# Patient Record
Sex: MALE | Race: WHITE | NOT HISPANIC OR LATINO | Employment: FULL TIME | URBAN - METROPOLITAN AREA
[De-identification: names, ages, dates, MRNs, and addresses within clinical notes are randomized per-mention and may not be internally consistent; named-entity substitution may affect disease eponyms.]

---

## 2019-03-07 ENCOUNTER — HOSPITAL ENCOUNTER (EMERGENCY)
Facility: HOSPITAL | Age: 54
Discharge: HOME | End: 2019-03-07
Attending: EMERGENCY MEDICINE
Payer: COMMERCIAL

## 2019-03-07 VITALS
SYSTOLIC BLOOD PRESSURE: 154 MMHG | HEART RATE: 88 BPM | RESPIRATION RATE: 16 BRPM | DIASTOLIC BLOOD PRESSURE: 81 MMHG | OXYGEN SATURATION: 97 % | WEIGHT: 287.48 LBS | BODY MASS INDEX: 43.71 KG/M2 | TEMPERATURE: 98 F

## 2019-03-07 DIAGNOSIS — K04.7 DENTAL INFECTION: Primary | ICD-10-CM

## 2019-03-07 PROCEDURE — 99282 EMERGENCY DEPT VISIT SF MDM: CPT

## 2019-03-07 RX ORDER — CLINDAMYCIN HYDROCHLORIDE 300 MG/1
300 CAPSULE ORAL 3 TIMES DAILY
Qty: 30 CAPSULE | Refills: 0 | Status: SHIPPED | OUTPATIENT
Start: 2019-03-07 | End: 2019-03-17

## 2019-03-07 NOTE — ED PROVIDER NOTES
History  Chief Complaint   Patient presents with    Dental Pain     pt states he was diagnosed with an abcess in mouth, given antibiotics on monday by dentist  antibiotics are not working, dentist told pt to come to er due to swelling in mouth  58-year-old male presents to the emergency department with complaints of right-sided facial pain and swelling  States that he is at his dentist last week for routine cleaning and started to have some discomfort along his right upper tooth shortly afterward  States that he was started on amoxicillin by his dentist 3 days ago and has been taking the antibiotic as directed  States that he noticed some of the overlying facial swelling has improved but has not resolved  States that his dentist instructed him to come to the emergency department for evaluation as he may need a stronger antibiotic  History provided by:  Patient   used: No        Prior to Admission Medications   Prescriptions Last Dose Informant Patient Reported? Taking?   atorvastatin (LIPITOR) 80 mg tablet   Yes No   Sig: Take 80 mg by mouth daily  lisinopril (ZESTRIL) 2 5 mg tablet   Yes No   Sig: Take 2 5 mg by mouth daily  Facility-Administered Medications: None       Past Medical History:   Diagnosis Date    Hypertension        Past Surgical History:   Procedure Laterality Date    APPENDECTOMY      ARTHROPLASTY TOE Right 7/5/2016    Procedure: GREAT TOE TOTAL JOINT REPLACEMENT ;  Surgeon: Mert Nieto DPM;  [facility];  Service:     SHOULDER ARTHROSCOPY Right        History reviewed  No pertinent family history  I have reviewed and agree with the history as documented  Social History     Tobacco Use    Smoking status: Never Smoker   Substance Use Topics    Alcohol use: No    Drug use: No        Review of Systems   Constitutional: Negative for chills and fever  HENT: Positive for dental problem and facial swelling   Negative for drooling, ear pain, sinus pressure, sneezing, sore throat and trouble swallowing  Eyes: Negative for pain  Respiratory: Negative for cough and chest tightness  Cardiovascular: Negative for chest pain  Skin: Negative for rash and wound  All other systems reviewed and are negative  Physical Exam  Physical Exam   Constitutional: He is oriented to person, place, and time  Vital signs are normal  He appears well-developed and well-nourished  HENT:   Head: Normocephalic and atraumatic  Right Ear: Hearing, tympanic membrane, external ear and ear canal normal    Left Ear: Hearing, tympanic membrane, external ear and ear canal normal    Nose: Nose normal    Mouth/Throat: Uvula is midline and oropharynx is clear and moist  Dental caries present  No dental abscesses  Neck: Trachea normal and normal range of motion  Cardiovascular: Normal rate and regular rhythm  Exam reveals no gallop and no friction rub  No murmur heard  Pulmonary/Chest: Effort normal and breath sounds normal  No respiratory distress  He has no wheezes  He has no rales  Musculoskeletal: Normal range of motion  Neurological: He is alert and oriented to person, place, and time  Skin: Skin is warm and dry  Psychiatric: He has a normal mood and affect  His behavior is normal    Vitals reviewed        Vital Signs  ED Triage Vitals [03/07/19 0805]   Temperature Pulse Respirations Blood Pressure SpO2   98 °F (36 7 °C) 88 16 154/81 97 %      Temp Source Heart Rate Source Patient Position - Orthostatic VS BP Location FiO2 (%)   Oral Monitor Lying Left arm --      Pain Score       4           Vitals:    03/07/19 0805   BP: 154/81   Pulse: 88   Patient Position - Orthostatic VS: Lying       Visual Acuity      ED Medications  Medications - No data to display    Diagnostic Studies  Results Reviewed     None                 No orders to display              Procedures  Procedures       Phone Contacts  ED Phone Contact    ED Course MDM  Number of Diagnoses or Management Options  Dental infection:   Diagnosis management comments: Differential diagnosis includes but not limited to:  Dental pain from dental abscess        Disposition  Final diagnoses:   Dental infection     Time reflects when diagnosis was documented in both MDM as applicable and the Disposition within this note     Time User Action Codes Description Comment    3/7/2019  8:18 AM Sagrario Nuno Add [K04 7] Dental infection       ED Disposition     ED Disposition Condition Date/Time Comment    Discharge Stable u Mar 7, 2019  8:18 AM Lakshmi Downing discharge to home/self care  Follow-up Information     Follow up With Specialties Details Why [address] [place] [address] for Oral and Maxillofacial Surgery [place]  Schedule an appointment as soon as possible for a visit  If symptoms worsen [facility]  [phone]          Discharge Medication List as of 3/7/2019  8:19 AM      START taking these medications    Details   clindamycin (CLEOCIN) 300 MG capsule Take 1 capsule (300 mg total) by mouth 3 (three) times a day for 10 days, Starting u 3/7/2019, Until Sun 3/17/2019, Normal         CONTINUE these medications which have NOT CHANGED    Details   atorvastatin (LIPITOR) 80 mg tablet Take 80 mg by mouth daily  , Until Discontinued, Historical Med      lisinopril (ZESTRIL) 2 5 mg tablet Take 2 5 mg by mouth daily  , Until Discontinued, Historical Med           No discharge procedures on file      ED Provider  Electronically Signed by           Angelita Larios PA-C  03/07/19 425

## 2020-05-01 DIAGNOSIS — I10 ESSENTIAL HYPERTENSION: Primary | ICD-10-CM

## 2020-05-01 RX ORDER — LISINOPRIL AND HYDROCHLOROTHIAZIDE 20; 12.5 MG/1; MG/1
TABLET ORAL
Qty: 30 TABLET | Refills: 0 | Status: SHIPPED | OUTPATIENT
Start: 2020-05-01 | End: 2020-06-22

## 2020-06-22 DIAGNOSIS — I10 ESSENTIAL HYPERTENSION: ICD-10-CM

## 2020-06-22 RX ORDER — LISINOPRIL AND HYDROCHLOROTHIAZIDE 20; 12.5 MG/1; MG/1
TABLET ORAL
Qty: 30 TABLET | Refills: 0 | Status: SHIPPED | OUTPATIENT
Start: 2020-06-22 | End: 2020-07-20

## 2020-07-20 DIAGNOSIS — I10 ESSENTIAL HYPERTENSION: ICD-10-CM

## 2020-07-20 DIAGNOSIS — E78.5 DYSLIPIDEMIA: Primary | ICD-10-CM

## 2020-07-20 RX ORDER — LISINOPRIL AND HYDROCHLOROTHIAZIDE 20; 12.5 MG/1; MG/1
TABLET ORAL
Qty: 30 TABLET | Refills: 0 | Status: SHIPPED | OUTPATIENT
Start: 2020-07-20 | End: 2020-08-14

## 2020-07-20 RX ORDER — ATORVASTATIN CALCIUM 80 MG/1
TABLET, FILM COATED ORAL
Qty: 30 TABLET | Refills: 0 | Status: SHIPPED | OUTPATIENT
Start: 2020-07-20 | End: 2020-08-14

## 2020-08-14 DIAGNOSIS — E78.5 DYSLIPIDEMIA: ICD-10-CM

## 2020-08-14 DIAGNOSIS — I10 ESSENTIAL HYPERTENSION: ICD-10-CM

## 2020-08-14 RX ORDER — LISINOPRIL AND HYDROCHLOROTHIAZIDE 20; 12.5 MG/1; MG/1
TABLET ORAL
Qty: 30 TABLET | Refills: 0 | Status: SHIPPED | OUTPATIENT
Start: 2020-08-14 | End: 2020-09-14

## 2020-08-14 RX ORDER — ATORVASTATIN CALCIUM 80 MG/1
TABLET, FILM COATED ORAL
Qty: 30 TABLET | Refills: 0 | Status: SHIPPED | OUTPATIENT
Start: 2020-08-14 | End: 2020-09-14

## 2020-09-14 DIAGNOSIS — E78.5 DYSLIPIDEMIA: ICD-10-CM

## 2020-09-14 DIAGNOSIS — I10 ESSENTIAL HYPERTENSION: ICD-10-CM

## 2020-09-14 RX ORDER — LISINOPRIL AND HYDROCHLOROTHIAZIDE 20; 12.5 MG/1; MG/1
TABLET ORAL
Qty: 30 TABLET | Refills: 0 | Status: SHIPPED | OUTPATIENT
Start: 2020-09-14 | End: 2020-10-12

## 2020-09-14 RX ORDER — ATORVASTATIN CALCIUM 80 MG/1
TABLET, FILM COATED ORAL
Qty: 30 TABLET | Refills: 0 | Status: SHIPPED | OUTPATIENT
Start: 2020-09-14 | End: 2020-10-12

## 2020-10-12 DIAGNOSIS — E78.5 DYSLIPIDEMIA: ICD-10-CM

## 2020-10-12 DIAGNOSIS — I10 ESSENTIAL HYPERTENSION: ICD-10-CM

## 2020-10-12 RX ORDER — LISINOPRIL AND HYDROCHLOROTHIAZIDE 20; 12.5 MG/1; MG/1
TABLET ORAL
Qty: 30 TABLET | Refills: 0 | Status: SHIPPED | OUTPATIENT
Start: 2020-10-12 | End: 2020-11-08

## 2020-10-12 RX ORDER — ATORVASTATIN CALCIUM 80 MG/1
TABLET, FILM COATED ORAL
Qty: 30 TABLET | Refills: 0 | Status: SHIPPED | OUTPATIENT
Start: 2020-10-12 | End: 2020-11-08

## 2020-11-08 DIAGNOSIS — I10 ESSENTIAL HYPERTENSION: ICD-10-CM

## 2020-11-08 DIAGNOSIS — E78.5 DYSLIPIDEMIA: ICD-10-CM

## 2020-11-08 RX ORDER — LISINOPRIL AND HYDROCHLOROTHIAZIDE 20; 12.5 MG/1; MG/1
TABLET ORAL
Qty: 30 TABLET | Refills: 0 | Status: SHIPPED | OUTPATIENT
Start: 2020-11-08 | End: 2020-12-13

## 2020-11-08 RX ORDER — ATORVASTATIN CALCIUM 80 MG/1
TABLET, FILM COATED ORAL
Qty: 30 TABLET | Refills: 0 | Status: SHIPPED | OUTPATIENT
Start: 2020-11-08 | End: 2020-12-13

## 2020-12-13 DIAGNOSIS — I10 ESSENTIAL HYPERTENSION: ICD-10-CM

## 2020-12-13 DIAGNOSIS — E78.5 DYSLIPIDEMIA: ICD-10-CM

## 2020-12-13 RX ORDER — LISINOPRIL AND HYDROCHLOROTHIAZIDE 20; 12.5 MG/1; MG/1
TABLET ORAL
Qty: 30 TABLET | Refills: 0 | Status: SHIPPED | OUTPATIENT
Start: 2020-12-13 | End: 2021-01-21

## 2020-12-13 RX ORDER — ATORVASTATIN CALCIUM 80 MG/1
TABLET, FILM COATED ORAL
Qty: 30 TABLET | Refills: 0 | Status: SHIPPED | OUTPATIENT
Start: 2020-12-13 | End: 2021-01-21

## 2021-01-21 ENCOUNTER — TELEPHONE (OUTPATIENT)
Dept: FAMILY MEDICINE CLINIC | Facility: CLINIC | Age: 56
End: 2021-01-21

## 2021-01-21 DIAGNOSIS — E78.5 DYSLIPIDEMIA: ICD-10-CM

## 2021-01-21 DIAGNOSIS — I10 ESSENTIAL HYPERTENSION: Primary | ICD-10-CM

## 2021-01-21 DIAGNOSIS — Z12.5 SCREENING FOR MALIGNANT NEOPLASM OF PROSTATE: ICD-10-CM

## 2021-01-21 DIAGNOSIS — I10 ESSENTIAL HYPERTENSION: ICD-10-CM

## 2021-01-21 RX ORDER — LISINOPRIL AND HYDROCHLOROTHIAZIDE 20; 12.5 MG/1; MG/1
TABLET ORAL
Qty: 30 TABLET | Refills: 0 | Status: SHIPPED | OUTPATIENT
Start: 2021-01-21 | End: 2021-02-19

## 2021-01-21 RX ORDER — ATORVASTATIN CALCIUM 80 MG/1
TABLET, FILM COATED ORAL
Qty: 30 TABLET | Refills: 0 | Status: SHIPPED | OUTPATIENT
Start: 2021-01-21 | End: 2021-02-19

## 2021-01-21 NOTE — TELEPHONE ENCOUNTER
Is working 7 days a week, with [employer], VERY hard to get here SO he scheduled a virtual visit on 2/2  He would like labs done prior & his wife can  script here tomorrow, if you're willing to order

## 2021-01-24 LAB
ALBUMIN SERPL-MCNC: 4.8 G/DL (ref 3.6–5.1)
ALBUMIN/GLOB SERPL: 1.9 (CALC) (ref 1–2.5)
ALP SERPL-CCNC: 67 U/L (ref 35–144)
ALT SERPL-CCNC: 30 U/L (ref 9–46)
AST SERPL-CCNC: 25 U/L (ref 10–35)
BASOPHILS # BLD AUTO: 66 CELLS/UL (ref 0–200)
BASOPHILS NFR BLD AUTO: 0.6 %
BILIRUB SERPL-MCNC: 0.9 MG/DL (ref 0.2–1.2)
BUN SERPL-MCNC: 16 MG/DL (ref 7–25)
BUN/CREAT SERPL: NORMAL (CALC) (ref 6–22)
CALCIUM SERPL-MCNC: 9.5 MG/DL (ref 8.6–10.3)
CHLORIDE SERPL-SCNC: 102 MMOL/L (ref 98–110)
CHOLEST SERPL-MCNC: 186 MG/DL
CHOLEST/HDLC SERPL: 3.5 (CALC)
CO2 SERPL-SCNC: 24 MMOL/L (ref 20–32)
CREAT SERPL-MCNC: 0.8 MG/DL (ref 0.7–1.33)
EOSINOPHIL # BLD AUTO: 275 CELLS/UL (ref 15–500)
EOSINOPHIL NFR BLD AUTO: 2.5 %
ERYTHROCYTE [DISTWIDTH] IN BLOOD BY AUTOMATED COUNT: 11.7 % (ref 11–15)
GLOBULIN SER CALC-MCNC: 2.5 G/DL (CALC) (ref 1.9–3.7)
GLUCOSE SERPL-MCNC: 99 MG/DL (ref 65–99)
HCT VFR BLD AUTO: 45.9 % (ref 38.5–50)
HDLC SERPL-MCNC: 53 MG/DL
HGB BLD-MCNC: 15.8 G/DL (ref 13.2–17.1)
LDLC SERPL CALC-MCNC: 93 MG/DL (CALC)
LYMPHOCYTES # BLD AUTO: 2684 CELLS/UL (ref 850–3900)
LYMPHOCYTES NFR BLD AUTO: 24.4 %
MCH RBC QN AUTO: 33.2 PG (ref 27–33)
MCHC RBC AUTO-ENTMCNC: 34.4 G/DL (ref 32–36)
MCV RBC AUTO: 96.4 FL (ref 80–100)
MONOCYTES # BLD AUTO: 968 CELLS/UL (ref 200–950)
MONOCYTES NFR BLD AUTO: 8.8 %
NEUTROPHILS # BLD AUTO: 7007 CELLS/UL (ref 1500–7800)
NEUTROPHILS NFR BLD AUTO: 63.7 %
NONHDLC SERPL-MCNC: 133 MG/DL (CALC)
PLATELET # BLD AUTO: 275 THOUSAND/UL (ref 140–400)
PMV BLD REES-ECKER: 9.4 FL (ref 7.5–12.5)
POTASSIUM SERPL-SCNC: 4.3 MMOL/L (ref 3.5–5.3)
PROT SERPL-MCNC: 7.3 G/DL (ref 6.1–8.1)
PSA SERPL-MCNC: 0.5 NG/ML
RBC # BLD AUTO: 4.76 MILLION/UL (ref 4.2–5.8)
SL AMB EGFR AFRICAN AMERICAN: 116 ML/MIN/1.73M2
SL AMB EGFR NON AFRICAN AMERICAN: 100 ML/MIN/1.73M2
SODIUM SERPL-SCNC: 136 MMOL/L (ref 135–146)
TRIGL SERPL-MCNC: 279 MG/DL
WBC # BLD AUTO: 11 THOUSAND/UL (ref 3.8–10.8)

## 2021-01-30 PROBLEM — E78.00 HYPERCHOLESTEROLEMIA: Status: ACTIVE | Noted: 2021-01-30

## 2021-01-30 PROBLEM — I10 ESSENTIAL HYPERTENSION: Status: ACTIVE | Noted: 2021-01-30

## 2021-01-30 PROBLEM — K63.5 COLON POLYPS: Status: ACTIVE | Noted: 2021-01-30

## 2021-02-19 DIAGNOSIS — I10 ESSENTIAL HYPERTENSION: ICD-10-CM

## 2021-02-19 DIAGNOSIS — E78.5 DYSLIPIDEMIA: ICD-10-CM

## 2021-02-19 RX ORDER — LISINOPRIL AND HYDROCHLOROTHIAZIDE 20; 12.5 MG/1; MG/1
TABLET ORAL
Qty: 30 TABLET | Refills: 0 | Status: SHIPPED | OUTPATIENT
Start: 2021-02-19 | End: 2021-03-02 | Stop reason: ALTCHOICE

## 2021-02-19 RX ORDER — ATORVASTATIN CALCIUM 80 MG/1
TABLET, FILM COATED ORAL
Qty: 30 TABLET | Refills: 0 | Status: SHIPPED | OUTPATIENT
Start: 2021-02-19 | End: 2021-03-02 | Stop reason: SDUPTHER

## 2021-03-02 ENCOUNTER — OFFICE VISIT (OUTPATIENT)
Dept: FAMILY MEDICINE CLINIC | Facility: CLINIC | Age: 56
End: 2021-03-02
Payer: COMMERCIAL

## 2021-03-02 VITALS
TEMPERATURE: 97.8 F | WEIGHT: 273 LBS | DIASTOLIC BLOOD PRESSURE: 70 MMHG | BODY MASS INDEX: 41.37 KG/M2 | HEIGHT: 68 IN | HEART RATE: 84 BPM | OXYGEN SATURATION: 98 % | SYSTOLIC BLOOD PRESSURE: 100 MMHG | RESPIRATION RATE: 16 BRPM

## 2021-03-02 DIAGNOSIS — R05.3 CHRONIC COUGH: ICD-10-CM

## 2021-03-02 DIAGNOSIS — E78.00 HYPERCHOLESTEROLEMIA: ICD-10-CM

## 2021-03-02 DIAGNOSIS — K63.5 POLYP OF COLON, UNSPECIFIED PART OF COLON, UNSPECIFIED TYPE: ICD-10-CM

## 2021-03-02 DIAGNOSIS — I10 ESSENTIAL HYPERTENSION: Primary | ICD-10-CM

## 2021-03-02 PROCEDURE — 99214 OFFICE O/P EST MOD 30 MIN: CPT | Performed by: FAMILY MEDICINE

## 2021-03-02 PROCEDURE — 3008F BODY MASS INDEX DOCD: CPT | Performed by: FAMILY MEDICINE

## 2021-03-02 PROCEDURE — 3725F SCREEN DEPRESSION PERFORMED: CPT | Performed by: FAMILY MEDICINE

## 2021-03-02 PROCEDURE — 1036F TOBACCO NON-USER: CPT | Performed by: FAMILY MEDICINE

## 2021-03-02 RX ORDER — ATORVASTATIN CALCIUM 80 MG/1
80 TABLET, FILM COATED ORAL
COMMUNITY
End: 2021-04-01

## 2021-03-02 RX ORDER — VALSARTAN AND HYDROCHLOROTHIAZIDE 80; 12.5 MG/1; MG/1
1 TABLET, FILM COATED ORAL DAILY
Qty: 30 TABLET | Refills: 3 | Status: SHIPPED | OUTPATIENT
Start: 2021-03-02 | End: 2021-06-20

## 2021-03-02 RX ORDER — LISINOPRIL 2.5 MG/1
2.5 TABLET ORAL
COMMUNITY
End: 2021-03-02 | Stop reason: SDUPTHER

## 2021-03-02 NOTE — ASSESSMENT & PLAN NOTE
BP good  Labs reviewed with pt and were ok  Will change med due to cough  Continue exercise and low Na diet

## 2021-03-02 NOTE — PROGRESS NOTES
BMI Counseling: Body mass index is 41 51 kg/m²  The BMI is above normal  Nutrition recommendations include decreasing portion sizes, encouraging healthy choices of fruits and vegetables, consuming healthier snacks and moderation in carbohydrate intake  Exercise recommendations include exercising 3-5 times per week  No pharmacotherapy was ordered  Assessment/Plan:         Problem List Items Addressed This Visit        Digestive    Colon polyps     Had colo in Apr 2018 and had 2 polyps  Due for next one in Apr 2021  Cardiovascular and Mediastinum    Essential hypertension - Primary     BP good  Labs reviewed with pt and were ok  Will change med due to cough  Continue exercise and low Na diet  Relevant Medications    valsartan-hydrochlorothiazide (DIOVAN-HCT) 80-12 5 MG per tablet       Other    Hypercholesterolemia     LDL 90 and LFTs normal in Jan 2021  Cont atorvastatin 80 mg qhs  Relevant Medications    atorvastatin (LIPITOR) 80 mg tablet    Chronic cough     Likely due to ACE  Will change med to see if it resolves  Subjective:      Patient ID: Erik Ash is a 63 y o  male  Pt here for f/u HTN, HL, colon polyps  Doing ok  No cp/sob  No headaches  Pt exercising and following low Na diet  Trying to eat healthy  No new c/o  Last colo was in 4/18 and due for one this year  Has developed dry cough/tickle in throat  Had labs done in Jan 2021  The following portions of the patient's history were reviewed and updated as appropriate:   Past Medical History:  He has a past medical history of Hyperlipidemia and Hypertension  ,  _______________________________________________________________________  Medical Problems:  does not have any pertinent problems on file ,  _______________________________________________________________________  Past Surgical History:   has a past surgical history that includes Shoulder arthroscopy (Right);  Appendectomy; and ARTHROPLASTY TOE (Right, 7/5/2016)  ,  _______________________________________________________________________  Family History:  family history includes Heart attack in his father; No Known Problems in his mother ,  _______________________________________________________________________  Social History:   reports that he has never smoked  He has never used smokeless tobacco  He reports that he does not drink alcohol or use drugs  ,  _______________________________________________________________________  Allergies:  has No Known Allergies     _______________________________________________________________________  Current Outpatient Medications   Medication Sig Dispense Refill    ASPIRIN 81 PO Take 81 mg by mouth daily      atorvastatin (LIPITOR) 80 mg tablet Take 80 mg by mouth      valsartan-hydrochlorothiazide (DIOVAN-HCT) 80-12 5 MG per tablet Take 1 tablet by mouth daily 30 tablet 3     No current facility-administered medications for this visit       _______________________________________________________________________  Review of Systems   Constitutional: Negative for fatigue and unexpected weight change  Respiratory: Positive for cough  Negative for shortness of breath  Cardiovascular: Negative for chest pain  Gastrointestinal: Negative for abdominal pain, constipation, diarrhea and vomiting  Musculoskeletal: Negative for arthralgias  Neurological: Negative for dizziness and headaches  Psychiatric/Behavioral: Negative for dysphoric mood  The patient is not nervous/anxious  Objective:  Vitals:    03/02/21 1749   BP: 100/70   BP Location: Left arm   Patient Position: Sitting   Cuff Size: Large   Pulse: 84   Resp: 16   Temp: 97 8 °F (36 6 °C)   SpO2: 98%   Weight: 124 kg (273 lb)   Height: 5' 8" (1 727 m)     Body mass index is 41 51 kg/m²  Physical Exam  Vitals signs and nursing note reviewed  Constitutional:       Appearance: Normal appearance  He is well-developed  He is obese     Neck: Musculoskeletal: Normal range of motion and neck supple  Thyroid: No thyromegaly  Cardiovascular:      Rate and Rhythm: Normal rate and regular rhythm  Heart sounds: Normal heart sounds  No murmur  Pulmonary:      Effort: Pulmonary effort is normal  No respiratory distress  Breath sounds: Normal breath sounds  No wheezing  Musculoskeletal:      Right lower leg: No edema  Left lower leg: No edema  Lymphadenopathy:      Cervical: No cervical adenopathy  Neurological:      Mental Status: He is alert and oriented to person, place, and time  Cranial Nerves: No cranial nerve deficit  Psychiatric:         Mood and Affect: Mood normal          Behavior: Behavior normal          Thought Content:  Thought content normal          Judgment: Judgment normal

## 2021-04-01 DIAGNOSIS — E78.00 HYPERCHOLESTEROLEMIA: Primary | ICD-10-CM

## 2021-04-01 RX ORDER — ATORVASTATIN CALCIUM 80 MG/1
TABLET, FILM COATED ORAL
Qty: 30 TABLET | Refills: 5 | Status: SHIPPED | OUTPATIENT
Start: 2021-04-01 | End: 2021-09-21

## 2021-06-20 DIAGNOSIS — I10 ESSENTIAL HYPERTENSION: ICD-10-CM

## 2021-06-20 RX ORDER — VALSARTAN AND HYDROCHLOROTHIAZIDE 80; 12.5 MG/1; MG/1
TABLET, FILM COATED ORAL
Qty: 30 TABLET | Refills: 0 | Status: SHIPPED | OUTPATIENT
Start: 2021-06-20 | End: 2021-07-19

## 2021-07-19 DIAGNOSIS — I10 ESSENTIAL HYPERTENSION: ICD-10-CM

## 2021-07-19 RX ORDER — VALSARTAN AND HYDROCHLOROTHIAZIDE 80; 12.5 MG/1; MG/1
TABLET, FILM COATED ORAL
Qty: 30 TABLET | Refills: 0 | Status: SHIPPED | OUTPATIENT
Start: 2021-07-19 | End: 2021-08-16

## 2021-08-16 DIAGNOSIS — I10 ESSENTIAL HYPERTENSION: ICD-10-CM

## 2021-08-16 RX ORDER — VALSARTAN AND HYDROCHLOROTHIAZIDE 80; 12.5 MG/1; MG/1
TABLET, FILM COATED ORAL
Qty: 30 TABLET | Refills: 0 | Status: SHIPPED | OUTPATIENT
Start: 2021-08-16 | End: 2021-09-13

## 2021-09-12 DIAGNOSIS — I10 ESSENTIAL HYPERTENSION: ICD-10-CM

## 2021-09-13 RX ORDER — VALSARTAN AND HYDROCHLOROTHIAZIDE 80; 12.5 MG/1; MG/1
TABLET, FILM COATED ORAL
Qty: 30 TABLET | Refills: 0 | Status: SHIPPED | OUTPATIENT
Start: 2021-09-13 | End: 2021-10-15

## 2021-10-15 DIAGNOSIS — I10 ESSENTIAL HYPERTENSION: ICD-10-CM

## 2021-10-15 RX ORDER — VALSARTAN AND HYDROCHLOROTHIAZIDE 80; 12.5 MG/1; MG/1
TABLET, FILM COATED ORAL
Qty: 30 TABLET | Refills: 0 | Status: SHIPPED | OUTPATIENT
Start: 2021-10-15 | End: 2021-11-19

## 2021-11-19 DIAGNOSIS — I10 ESSENTIAL HYPERTENSION: ICD-10-CM

## 2021-11-19 RX ORDER — VALSARTAN AND HYDROCHLOROTHIAZIDE 80; 12.5 MG/1; MG/1
TABLET, FILM COATED ORAL
Qty: 30 TABLET | Refills: 0 | Status: SHIPPED | OUTPATIENT
Start: 2021-11-19 | End: 2021-12-16

## 2021-12-16 DIAGNOSIS — I10 ESSENTIAL HYPERTENSION: ICD-10-CM

## 2021-12-16 RX ORDER — VALSARTAN AND HYDROCHLOROTHIAZIDE 80; 12.5 MG/1; MG/1
TABLET, FILM COATED ORAL
Qty: 30 TABLET | Refills: 0 | Status: SHIPPED | OUTPATIENT
Start: 2021-12-16 | End: 2022-01-14

## 2022-01-04 ENCOUNTER — TELEPHONE (OUTPATIENT)
Dept: FAMILY MEDICINE CLINIC | Facility: CLINIC | Age: 57
End: 2022-01-04

## 2022-01-04 DIAGNOSIS — R52 BODY ACHES: Primary | ICD-10-CM

## 2022-01-04 DIAGNOSIS — R05.9 COUGH: ICD-10-CM

## 2022-01-04 NOTE — TELEPHONE ENCOUNTER
Sick since last week, nasty cough, (will NOT go away)  Has a pnd going into chest   Concerned he may have flu  Did not get Covid vaccine  HAD fever, is "in a cloud"  Cough keeps him up ALL night  Asking for medication (ALSO not sure if he wants Covid/Flu test?)  NK[facility]  [place]

## 2022-01-12 ENCOUNTER — TELEPHONE (OUTPATIENT)
Dept: FAMILY MEDICINE CLINIC | Facility: CLINIC | Age: 57
End: 2022-01-12

## 2022-01-12 PROCEDURE — 87636 SARSCOV2 & INF A&B AMP PRB: CPT | Performed by: FAMILY MEDICINE

## 2022-01-12 NOTE — TELEPHONE ENCOUNTER
Lakshmi is TRYING to go back to work (feels better after Covid)  His one Covid test said "inconclussive")  He was home a couple of weeks  First day out was 12/31  Works for [employer]  Are we able to email an excuse for him? [email or web address]

## 2022-01-14 DIAGNOSIS — I10 ESSENTIAL HYPERTENSION: ICD-10-CM

## 2022-01-14 DIAGNOSIS — E78.00 HYPERCHOLESTEROLEMIA: ICD-10-CM

## 2022-01-14 RX ORDER — VALSARTAN AND HYDROCHLOROTHIAZIDE 80; 12.5 MG/1; MG/1
TABLET, FILM COATED ORAL
Qty: 30 TABLET | Refills: 0 | Status: SHIPPED | OUTPATIENT
Start: 2022-01-14 | End: 2022-03-25 | Stop reason: SDUPTHER

## 2022-01-14 RX ORDER — ATORVASTATIN CALCIUM 80 MG/1
TABLET, FILM COATED ORAL
Qty: 30 TABLET | Refills: 0 | Status: SHIPPED | OUTPATIENT
Start: 2022-01-14 | End: 2022-03-25 | Stop reason: SDUPTHER

## 2022-03-02 ENCOUNTER — TELEPHONE (OUTPATIENT)
Dept: GASTROENTEROLOGY | Facility: CLINIC | Age: 57
End: 2022-03-02

## 2022-03-02 NOTE — TELEPHONE ENCOUNTER
Recall call went out via [employer] in 4/2021 with no return calls from pt to schedule  Pt is due for a colonoscopy with Dr Guilford Hammans for hx of tubular adenoma polyps  I called pt, however, voicemail box full  Will call again in one week

## 2022-03-24 ENCOUNTER — TELEPHONE (OUTPATIENT)
Dept: FAMILY MEDICINE CLINIC | Facility: CLINIC | Age: 57
End: 2022-03-24

## 2022-03-24 DIAGNOSIS — E78.00 HYPERCHOLESTEROLEMIA: ICD-10-CM

## 2022-03-24 DIAGNOSIS — I10 ESSENTIAL HYPERTENSION: ICD-10-CM

## 2022-03-24 RX ORDER — VALSARTAN AND HYDROCHLOROTHIAZIDE 80; 12.5 MG/1; MG/1
TABLET, FILM COATED ORAL
Qty: 30 TABLET | Refills: 0 | OUTPATIENT
Start: 2022-03-24

## 2022-03-24 RX ORDER — ATORVASTATIN CALCIUM 80 MG/1
TABLET, FILM COATED ORAL
Qty: 30 TABLET | Refills: 0 | OUTPATIENT
Start: 2022-03-24

## 2022-03-24 NOTE — TELEPHONE ENCOUNTER
Lakshmi works ALL day & needed late day appt  He DID make one for 4/26  BUT he is completely out of medication for bp & asks if you could order enough until he's seen? ALSO is asking if you could give him a lab order    Needs:  Atorvastatin 80mg & Valsartan/HCTZ 80/12 5mg  [address]

## 2022-03-25 DIAGNOSIS — Z12.5 ENCOUNTER FOR PROSTATE CANCER SCREENING: Primary | ICD-10-CM

## 2022-03-25 DIAGNOSIS — E78.00 HYPERCHOLESTEROLEMIA: ICD-10-CM

## 2022-03-25 DIAGNOSIS — I10 ESSENTIAL HYPERTENSION: ICD-10-CM

## 2022-03-25 RX ORDER — ATORVASTATIN CALCIUM 80 MG/1
80 TABLET, FILM COATED ORAL DAILY
Qty: 30 TABLET | Refills: 1 | Status: SHIPPED | OUTPATIENT
Start: 2022-03-25 | End: 2022-04-26 | Stop reason: SDUPTHER

## 2022-03-25 RX ORDER — VALSARTAN AND HYDROCHLOROTHIAZIDE 80; 12.5 MG/1; MG/1
1 TABLET, FILM COATED ORAL DAILY
Qty: 30 TABLET | Refills: 1 | Status: SHIPPED | OUTPATIENT
Start: 2022-03-25 | End: 2022-04-26 | Stop reason: SDUPTHER

## 2022-04-11 LAB
ALBUMIN SERPL-MCNC: 4.8 G/DL (ref 3.6–5.1)
ALBUMIN/GLOB SERPL: 2 (CALC) (ref 1–2.5)
ALP SERPL-CCNC: 56 U/L (ref 35–144)
ALT SERPL-CCNC: 25 U/L (ref 9–46)
AST SERPL-CCNC: 21 U/L (ref 10–35)
BASOPHILS # BLD AUTO: 39 CELLS/UL (ref 0–200)
BASOPHILS NFR BLD AUTO: 0.5 %
BILIRUB SERPL-MCNC: 0.9 MG/DL (ref 0.2–1.2)
BUN SERPL-MCNC: 14 MG/DL (ref 7–25)
BUN/CREAT SERPL: 21 (CALC) (ref 6–22)
CALCIUM SERPL-MCNC: 10 MG/DL (ref 8.6–10.3)
CHLORIDE SERPL-SCNC: 104 MMOL/L (ref 98–110)
CHOLEST SERPL-MCNC: 161 MG/DL
CHOLEST/HDLC SERPL: 2.8 (CALC)
CO2 SERPL-SCNC: 28 MMOL/L (ref 20–32)
CREAT SERPL-MCNC: 0.68 MG/DL (ref 0.7–1.33)
EOSINOPHIL # BLD AUTO: 148 CELLS/UL (ref 15–500)
EOSINOPHIL NFR BLD AUTO: 1.9 %
ERYTHROCYTE [DISTWIDTH] IN BLOOD BY AUTOMATED COUNT: 12 % (ref 11–15)
GLOBULIN SER CALC-MCNC: 2.4 G/DL (CALC) (ref 1.9–3.7)
GLUCOSE SERPL-MCNC: 117 MG/DL (ref 65–99)
HCT VFR BLD AUTO: 44.1 % (ref 38.5–50)
HDLC SERPL-MCNC: 58 MG/DL
HGB BLD-MCNC: 15.6 G/DL (ref 13.2–17.1)
LDLC SERPL CALC-MCNC: 78 MG/DL (CALC)
LYMPHOCYTES # BLD AUTO: 2605 CELLS/UL (ref 850–3900)
LYMPHOCYTES NFR BLD AUTO: 33.4 %
MCH RBC QN AUTO: 33.3 PG (ref 27–33)
MCHC RBC AUTO-ENTMCNC: 35.4 G/DL (ref 32–36)
MCV RBC AUTO: 94.2 FL (ref 80–100)
MONOCYTES # BLD AUTO: 702 CELLS/UL (ref 200–950)
MONOCYTES NFR BLD AUTO: 9 %
NEUTROPHILS # BLD AUTO: 4306 CELLS/UL (ref 1500–7800)
NEUTROPHILS NFR BLD AUTO: 55.2 %
NONHDLC SERPL-MCNC: 103 MG/DL (CALC)
PLATELET # BLD AUTO: 250 THOUSAND/UL (ref 140–400)
PMV BLD REES-ECKER: 9.5 FL (ref 7.5–12.5)
POTASSIUM SERPL-SCNC: 4.1 MMOL/L (ref 3.5–5.3)
PROT SERPL-MCNC: 7.2 G/DL (ref 6.1–8.1)
PSA FREE MFR SERPL: 40 % (CALC)
PSA FREE SERPL-MCNC: 0.2 NG/ML
PSA SERPL-MCNC: 0.5 NG/ML
RBC # BLD AUTO: 4.68 MILLION/UL (ref 4.2–5.8)
SL AMB EGFR AFRICAN AMERICAN: 123 ML/MIN/1.73M2
SL AMB EGFR NON AFRICAN AMERICAN: 106 ML/MIN/1.73M2
SODIUM SERPL-SCNC: 139 MMOL/L (ref 135–146)
TRIGL SERPL-MCNC: 153 MG/DL
WBC # BLD AUTO: 7.8 THOUSAND/UL (ref 3.8–10.8)

## 2022-04-26 ENCOUNTER — OFFICE VISIT (OUTPATIENT)
Dept: FAMILY MEDICINE CLINIC | Facility: CLINIC | Age: 57
End: 2022-04-26
Payer: COMMERCIAL

## 2022-04-26 VITALS
HEART RATE: 71 BPM | OXYGEN SATURATION: 97 % | DIASTOLIC BLOOD PRESSURE: 82 MMHG | SYSTOLIC BLOOD PRESSURE: 138 MMHG | TEMPERATURE: 97.2 F | BODY MASS INDEX: 41.68 KG/M2 | RESPIRATION RATE: 16 BRPM | WEIGHT: 275 LBS | HEIGHT: 68 IN

## 2022-04-26 DIAGNOSIS — R73.01 IMPAIRED FASTING GLUCOSE: ICD-10-CM

## 2022-04-26 DIAGNOSIS — I10 ESSENTIAL HYPERTENSION: Primary | ICD-10-CM

## 2022-04-26 DIAGNOSIS — E78.00 HYPERCHOLESTEROLEMIA: ICD-10-CM

## 2022-04-26 DIAGNOSIS — E66.01 MORBID OBESITY DUE TO EXCESS CALORIES (HCC): ICD-10-CM

## 2022-04-26 DIAGNOSIS — K63.5 POLYP OF COLON, UNSPECIFIED PART OF COLON, UNSPECIFIED TYPE: ICD-10-CM

## 2022-04-26 LAB — SL AMB POCT HEMOGLOBIN AIC: 5.4 (ref ?–6.5)

## 2022-04-26 PROCEDURE — 99214 OFFICE O/P EST MOD 30 MIN: CPT | Performed by: FAMILY MEDICINE

## 2022-04-26 PROCEDURE — 3008F BODY MASS INDEX DOCD: CPT | Performed by: FAMILY MEDICINE

## 2022-04-26 PROCEDURE — 1036F TOBACCO NON-USER: CPT | Performed by: FAMILY MEDICINE

## 2022-04-26 PROCEDURE — 3725F SCREEN DEPRESSION PERFORMED: CPT | Performed by: FAMILY MEDICINE

## 2022-04-26 PROCEDURE — 83036 HEMOGLOBIN GLYCOSYLATED A1C: CPT | Performed by: FAMILY MEDICINE

## 2022-04-26 RX ORDER — VALSARTAN AND HYDROCHLOROTHIAZIDE 80; 12.5 MG/1; MG/1
1 TABLET, FILM COATED ORAL DAILY
Qty: 90 TABLET | Refills: 2 | Status: SHIPPED | OUTPATIENT
Start: 2022-04-26

## 2022-04-26 RX ORDER — ATORVASTATIN CALCIUM 80 MG/1
80 TABLET, FILM COATED ORAL DAILY
Qty: 90 TABLET | Refills: 2 | Status: SHIPPED | OUTPATIENT
Start: 2022-04-26

## 2022-04-26 NOTE — PROGRESS NOTES
BMI Counseling: Body mass index is 41 81 kg/m²  The BMI is above normal  Nutrition recommendations include decreasing portion sizes, encouraging healthy choices of fruits and vegetables, consuming healthier snacks and moderation in carbohydrate intake  Exercise recommendations include exercising 3-5 times per week  No pharmacotherapy was ordered  Rationale for BMI follow-up plan is due to patient being overweight or obese  Depression Screening and Follow-up Plan: Patient was screened for depression during today's encounter  They screened negative with a PHQ-2 score of 0  Assessment/Plan:         Problem List Items Addressed This Visit        Digestive    Colon polyps     Had colonoscopy by Dr Pat Kinsey in April 2018 and pt had 2 polyps  Due for next one now  Endocrine    Impaired fasting glucose     A1C done today and was 5 4  Relevant Orders    POCT hemoglobin A1c       Cardiovascular and Mediastinum    Essential hypertension - Primary     BP good  Continue valsartan HCT 80-12 5 qd  Pt advised to continue low Na diet and to exercise on a regular basis  Relevant Medications    valsartan-hydrochlorothiazide (DIOVAN-HCT) 80-12 5 MG per tablet       Other    Morbid obesity due to excess calories (HCC)     Discussed smaller portions, healthy snacks, and regular exercise  Hypercholesterolemia     LDL 78 and LFTs normal in April 2022  Continue atorvastatin 80 mg qd  Relevant Medications    atorvastatin (LIPITOR) 80 mg tablet            Subjective:      Patient ID: Meghan Ham is a 61 y o  male  Pt here for f/u HTN, HL, Colon Polyps  Doing well  No cp/sob  No headaches  Pt exercises and follows low Na diet  BP good at home  Had labs recently  Needs to schedule colo  Has back pain at times  Pt doesn't want COVID vaccines or flu shot        The following portions of the patient's history were reviewed and updated as appropriate:   Past Medical History:  He has a past medical history of Hyperlipidemia and Hypertension  ,  _______________________________________________________________________  Medical Problems:  does not have any pertinent problems on file ,  _______________________________________________________________________  Past Surgical History:   has a past surgical history that includes Shoulder arthroscopy (Right); Appendectomy; and ARTHROPLASTY TOE (Right, 7/5/2016)  ,  _______________________________________________________________________  Family History:  family history includes Heart attack in his father; No Known Problems in his mother ,  _______________________________________________________________________  Social History:   reports that he has never smoked  He has never used smokeless tobacco  He reports that he does not drink alcohol and does not use drugs  ,  _______________________________________________________________________  Allergies:  has No Known Allergies     _______________________________________________________________________  Current Outpatient Medications   Medication Sig Dispense Refill    ASPIRIN 81 PO Take 81 mg by mouth daily      atorvastatin (LIPITOR) 80 mg tablet Take 1 tablet (80 mg total) by mouth daily 90 tablet 2    valsartan-hydrochlorothiazide (DIOVAN-HCT) 80-12 5 MG per tablet Take 1 tablet by mouth daily 90 tablet 2     No current facility-administered medications for this visit      _______________________________________________________________________  Review of Systems   Constitutional: Negative for fatigue and unexpected weight change  Respiratory: Negative for cough and shortness of breath  Cardiovascular: Negative for chest pain  Gastrointestinal: Negative for abdominal pain, constipation, diarrhea and vomiting  Musculoskeletal: Positive for back pain  Negative for arthralgias  Neurological: Negative for dizziness and headaches  Psychiatric/Behavioral: Negative for dysphoric mood   The patient is not nervous/anxious  Objective:  Vitals:    04/26/22 1702   BP: 138/82   BP Location: Left arm   Patient Position: Sitting   Cuff Size: Large   Pulse: 71   Resp: 16   Temp: (!) 97 2 °F (36 2 °C)   TempSrc: Temporal   SpO2: 97%   Weight: 125 kg (275 lb)   Height: 5' 8" (1 727 m)     Body mass index is 41 81 kg/m²  Physical Exam  Vitals and nursing note reviewed  Constitutional:       Appearance: Normal appearance  He is well-developed  He is obese  Neck:      Thyroid: No thyromegaly  Cardiovascular:      Rate and Rhythm: Normal rate and regular rhythm  Heart sounds: Normal heart sounds  No murmur heard  Pulmonary:      Effort: Pulmonary effort is normal  No respiratory distress  Breath sounds: Normal breath sounds  No wheezing  Musculoskeletal:      Cervical back: Normal range of motion and neck supple  Right lower leg: No edema  Left lower leg: No edema  Lymphadenopathy:      Cervical: No cervical adenopathy  Neurological:      Mental Status: He is alert and oriented to person, place, and time  Cranial Nerves: No cranial nerve deficit  Psychiatric:         Mood and Affect: Mood normal          Behavior: Behavior normal          Thought Content:  Thought content normal          Judgment: Judgment normal

## 2022-04-26 NOTE — ASSESSMENT & PLAN NOTE
BP good  Continue valsartan HCT 80-12 5 qd  Pt advised to continue low Na diet and to exercise on a regular basis

## 2022-12-02 ENCOUNTER — TELEPHONE (OUTPATIENT)
Dept: FAMILY MEDICINE CLINIC | Facility: CLINIC | Age: 57
End: 2022-12-02

## 2023-01-21 DIAGNOSIS — E78.00 HYPERCHOLESTEROLEMIA: ICD-10-CM

## 2023-01-21 DIAGNOSIS — I10 ESSENTIAL HYPERTENSION: ICD-10-CM

## 2023-01-23 RX ORDER — VALSARTAN AND HYDROCHLOROTHIAZIDE 80; 12.5 MG/1; MG/1
TABLET, FILM COATED ORAL
Qty: 90 TABLET | Refills: 0 | Status: SHIPPED | OUTPATIENT
Start: 2023-01-23

## 2023-01-23 RX ORDER — ATORVASTATIN CALCIUM 80 MG/1
TABLET, FILM COATED ORAL
Qty: 90 TABLET | Refills: 0 | Status: SHIPPED | OUTPATIENT
Start: 2023-01-23

## 2023-07-24 DIAGNOSIS — I10 ESSENTIAL HYPERTENSION: ICD-10-CM

## 2023-07-24 DIAGNOSIS — E78.00 HYPERCHOLESTEROLEMIA: ICD-10-CM

## 2023-07-24 RX ORDER — VALSARTAN AND HYDROCHLOROTHIAZIDE 80; 12.5 MG/1; MG/1
TABLET, FILM COATED ORAL
Qty: 90 TABLET | Refills: 0 | OUTPATIENT
Start: 2023-07-24

## 2023-07-24 RX ORDER — ATORVASTATIN CALCIUM 80 MG/1
TABLET, FILM COATED ORAL
Qty: 90 TABLET | Refills: 0 | OUTPATIENT
Start: 2023-07-24

## 2023-08-11 ENCOUNTER — TELEPHONE (OUTPATIENT)
Dept: FAMILY MEDICINE CLINIC | Facility: CLINIC | Age: 58
End: 2023-08-11

## 2023-08-11 DIAGNOSIS — E78.00 HYPERCHOLESTEROLEMIA: ICD-10-CM

## 2023-08-11 DIAGNOSIS — Z12.5 ENCOUNTER FOR PROSTATE CANCER SCREENING: Primary | ICD-10-CM

## 2023-08-11 DIAGNOSIS — R73.01 IMPAIRED FASTING GLUCOSE: ICD-10-CM

## 2023-08-11 DIAGNOSIS — I10 ESSENTIAL HYPERTENSION: ICD-10-CM

## 2023-08-11 NOTE — TELEPHONE ENCOUNTER
Lakshmi called scheduled an appt for Physical on 9/19    Says he will like to get his bloodwork done prior to appt so he can discuss at visit. Says he has always done it this way.     Please call @ # [phone]

## 2023-08-22 DIAGNOSIS — I10 ESSENTIAL HYPERTENSION: ICD-10-CM

## 2023-08-22 DIAGNOSIS — E78.00 HYPERCHOLESTEROLEMIA: ICD-10-CM

## 2023-08-23 RX ORDER — ATORVASTATIN CALCIUM 80 MG/1
TABLET, FILM COATED ORAL
Qty: 90 TABLET | Refills: 0 | Status: SHIPPED | OUTPATIENT
Start: 2023-08-23

## 2023-08-23 RX ORDER — VALSARTAN AND HYDROCHLOROTHIAZIDE 80; 12.5 MG/1; MG/1
TABLET, FILM COATED ORAL
Qty: 90 TABLET | Refills: 0 | Status: SHIPPED | OUTPATIENT
Start: 2023-08-23

## 2023-08-30 LAB
ALBUMIN SERPL-MCNC: 4.8 G/DL (ref 3.6–5.1)
ALBUMIN/GLOB SERPL: 1.8 (CALC) (ref 1–2.5)
ALP SERPL-CCNC: 59 U/L (ref 35–144)
ALT SERPL-CCNC: 26 U/L (ref 9–46)
AST SERPL-CCNC: 22 U/L (ref 10–35)
BASOPHILS # BLD AUTO: 44 CELLS/UL (ref 0–200)
BASOPHILS NFR BLD AUTO: 0.5 %
BILIRUB SERPL-MCNC: 1.3 MG/DL (ref 0.2–1.2)
BUN SERPL-MCNC: 19 MG/DL (ref 7–25)
BUN/CREAT SERPL: ABNORMAL (CALC) (ref 6–22)
CALCIUM SERPL-MCNC: 10 MG/DL (ref 8.6–10.3)
CHLORIDE SERPL-SCNC: 101 MMOL/L (ref 98–110)
CHOLEST SERPL-MCNC: 180 MG/DL
CHOLEST/HDLC SERPL: 3.1 (CALC)
CO2 SERPL-SCNC: 28 MMOL/L (ref 20–32)
CREAT SERPL-MCNC: 0.82 MG/DL (ref 0.7–1.3)
EOSINOPHIL # BLD AUTO: 218 CELLS/UL (ref 15–500)
EOSINOPHIL NFR BLD AUTO: 2.5 %
ERYTHROCYTE [DISTWIDTH] IN BLOOD BY AUTOMATED COUNT: 11.8 % (ref 11–15)
GFR/BSA.PRED SERPLBLD CYS-BASED-ARV: 101 ML/MIN/1.73M2
GLOBULIN SER CALC-MCNC: 2.6 G/DL (CALC) (ref 1.9–3.7)
GLUCOSE SERPL-MCNC: 113 MG/DL (ref 65–99)
HCT VFR BLD AUTO: 43.8 % (ref 38.5–50)
HDLC SERPL-MCNC: 59 MG/DL
HGB BLD-MCNC: 15.4 G/DL (ref 13.2–17.1)
LDLC SERPL CALC-MCNC: 84 MG/DL (CALC)
LYMPHOCYTES # BLD AUTO: 2497 CELLS/UL (ref 850–3900)
LYMPHOCYTES NFR BLD AUTO: 28.7 %
MCH RBC QN AUTO: 33.9 PG (ref 27–33)
MCHC RBC AUTO-ENTMCNC: 35.2 G/DL (ref 32–36)
MCV RBC AUTO: 96.5 FL (ref 80–100)
MONOCYTES # BLD AUTO: 774 CELLS/UL (ref 200–950)
MONOCYTES NFR BLD AUTO: 8.9 %
NEUTROPHILS # BLD AUTO: 5168 CELLS/UL (ref 1500–7800)
NEUTROPHILS NFR BLD AUTO: 59.4 %
NONHDLC SERPL-MCNC: 121 MG/DL (CALC)
PLATELET # BLD AUTO: 246 THOUSAND/UL (ref 140–400)
PMV BLD REES-ECKER: 9.1 FL (ref 7.5–12.5)
POTASSIUM SERPL-SCNC: 4 MMOL/L (ref 3.5–5.3)
PROT SERPL-MCNC: 7.4 G/DL (ref 6.1–8.1)
PSA SERPL-MCNC: 0.53 NG/ML
RBC # BLD AUTO: 4.54 MILLION/UL (ref 4.2–5.8)
SODIUM SERPL-SCNC: 138 MMOL/L (ref 135–146)
TRIGL SERPL-MCNC: 285 MG/DL
WBC # BLD AUTO: 8.7 THOUSAND/UL (ref 3.8–10.8)

## 2023-09-19 ENCOUNTER — OFFICE VISIT (OUTPATIENT)
Dept: FAMILY MEDICINE CLINIC | Facility: CLINIC | Age: 58
End: 2023-09-19
Payer: COMMERCIAL

## 2023-09-19 VITALS
HEART RATE: 72 BPM | BODY MASS INDEX: 42.74 KG/M2 | SYSTOLIC BLOOD PRESSURE: 134 MMHG | WEIGHT: 282 LBS | HEIGHT: 68 IN | DIASTOLIC BLOOD PRESSURE: 74 MMHG | OXYGEN SATURATION: 97 % | RESPIRATION RATE: 18 BRPM | TEMPERATURE: 98 F

## 2023-09-19 DIAGNOSIS — R73.03 PREDIABETES: ICD-10-CM

## 2023-09-19 DIAGNOSIS — I10 ESSENTIAL HYPERTENSION: ICD-10-CM

## 2023-09-19 DIAGNOSIS — E66.01 MORBID OBESITY DUE TO EXCESS CALORIES (HCC): ICD-10-CM

## 2023-09-19 DIAGNOSIS — R73.01 IMPAIRED FASTING GLUCOSE: ICD-10-CM

## 2023-09-19 DIAGNOSIS — K63.5 POLYP OF COLON, UNSPECIFIED PART OF COLON, UNSPECIFIED TYPE: ICD-10-CM

## 2023-09-19 DIAGNOSIS — Z00.00 ENCOUNTER FOR ANNUAL PHYSICAL EXAM: Primary | ICD-10-CM

## 2023-09-19 DIAGNOSIS — E78.00 HYPERCHOLESTEROLEMIA: ICD-10-CM

## 2023-09-19 LAB — SL AMB POCT HEMOGLOBIN AIC: 5.8 (ref ?–6.5)

## 2023-09-19 PROCEDURE — 83036 HEMOGLOBIN GLYCOSYLATED A1C: CPT | Performed by: FAMILY MEDICINE

## 2023-09-19 PROCEDURE — 99214 OFFICE O/P EST MOD 30 MIN: CPT | Performed by: FAMILY MEDICINE

## 2023-09-19 PROCEDURE — 99396 PREV VISIT EST AGE 40-64: CPT | Performed by: FAMILY MEDICINE

## 2023-09-19 RX ORDER — VALSARTAN AND HYDROCHLOROTHIAZIDE 80; 12.5 MG/1; MG/1
1 TABLET, FILM COATED ORAL DAILY
Qty: 30 TABLET | Refills: 6 | Status: SHIPPED | OUTPATIENT
Start: 2023-09-19

## 2023-09-19 RX ORDER — ATORVASTATIN CALCIUM 80 MG/1
80 TABLET, FILM COATED ORAL DAILY
Qty: 30 TABLET | Refills: 6 | Status: SHIPPED | OUTPATIENT
Start: 2023-09-19

## 2023-09-19 NOTE — ASSESSMENT & PLAN NOTE
Discussed smaller portions, healthy snacks, and regular exercise. Patient interested in bariatric procedure. Will refer.

## 2023-09-19 NOTE — ASSESSMENT & PLAN NOTE
A1C done today and was 5.8. Advised pt to follow a low carb diet and to exercise on a regular basis.

## 2023-09-19 NOTE — ASSESSMENT & PLAN NOTE
Blood pressure ok. Continue valsartan HCT 80-12.5 qd. Pt advised to continue low Na diet and to exercise on a regular basis.

## 2023-09-19 NOTE — ASSESSMENT & PLAN NOTE
LDL 84 and LFTs normal in Aug 2023. Continue atorvastatin 80 mg qd. Advised pt to follow a low cholesterol diet and to exercise on a regular basis.

## 2023-09-19 NOTE — PROGRESS NOTES
Assessment/Plan:         Problem List Items Addressed This Visit        Digestive    Colon polyps     Had colonoscopy by Dr Barry Murdock in April 2018 and pt had 2 polyps. Due for next one now. Relevant Orders    Ambulatory Referral to Gastroenterology       Cardiovascular and Mediastinum    Essential hypertension     Blood pressure ok. Continue valsartan HCT 80-12.5 qd. Pt advised to continue low Na diet and to exercise on a regular basis. Relevant Medications    valsartan-hydrochlorothiazide (DIOVAN-HCT) 80-12.5 MG per tablet       Other    Prediabetes     A1C done today and was 5.8. Advised pt to follow a low carb diet and to exercise on a regular basis. Morbid obesity due to excess calories (HCC)     Discussed smaller portions, healthy snacks, and regular exercise. Patient interested in bariatric procedure. Will refer. Relevant Orders    Ambulatory Referral to Bariatric Surgery    Hypercholesterolemia     LDL 84 and LFTs normal in Aug 2023. Continue atorvastatin 80 mg qd. Advised pt to follow a low cholesterol diet and to exercise on a regular basis. Relevant Medications    atorvastatin (LIPITOR) 80 mg tablet    Encounter for annual physical exam - Primary     CPE done. Last Tdap was in 2019. Declined flu shot and COVID vaccines. Recommend Shingrix. Labs are up to date. Patient advised to schedule colonoscopy. Pt advised to follow a well balanced, heart healthy diet and to exercise on a regular basis. Not a smoker. Subjective:      Patient ID: Naz Calderon is a 60 y.o. male. Patient here for physical and follow-up Hypertension, Hyperlipidemia, Impaired Fasting Glucose. Patient doing ok. No chest pain or shortness of breath. No headaches. Blood pressure usually good at home. Walks at times. Not a smoker. Occasional ETOH. Last Tdap was in 2019. Doesn't want flu shot or COVID vaccines. Didn't have Shingrix yet. Needs to get colonoscopy for follow-up polyps. The following portions of the patient's history were reviewed and updated as appropriate:   Past Medical History:  He has a past medical history of Hyperlipidemia and Hypertension. ,  _______________________________________________________________________  Medical Problems:  does not have any pertinent problems on file.,  _______________________________________________________________________  Past Surgical History:   has a past surgical history that includes Shoulder arthroscopy (Right); Appendectomy; and ARTHROPLASTY TOE (Right, 7/5/2016). ,  _______________________________________________________________________  Family History:  family history includes Heart attack in his father; No Known Problems in his mother.,  _______________________________________________________________________  Social History:   reports that he has never smoked. He has never used smokeless tobacco. He reports that he does not drink alcohol and does not use drugs. ,  _______________________________________________________________________  Allergies:  has No Known Allergies. .  _______________________________________________________________________  Current Outpatient Medications   Medication Sig Dispense Refill   • ASPIRIN 81 PO Take 81 mg by mouth daily     • atorvastatin (LIPITOR) 80 mg tablet Take 1 tablet (80 mg total) by mouth daily 30 tablet 6   • valsartan-hydrochlorothiazide (DIOVAN-HCT) 80-12.5 MG per tablet Take 1 tablet by mouth daily 30 tablet 6     No current facility-administered medications for this visit.     _______________________________________________________________________  Review of Systems   Constitutional: Negative for activity change, appetite change, fatigue and unexpected weight change. HENT: Negative for congestion, ear pain, rhinorrhea, sore throat and trouble swallowing. Eyes: Negative for pain, discharge and visual disturbance. Respiratory: Negative for cough, shortness of breath and wheezing. Cardiovascular: Negative for chest pain, palpitations and leg swelling. Gastrointestinal: Negative for abdominal pain, constipation, diarrhea, nausea and vomiting. Endocrine: Negative for polydipsia, polyphagia and polyuria. Genitourinary: Negative for difficulty urinating and hematuria. Musculoskeletal: Negative for arthralgias, back pain, gait problem, myalgias and neck pain. Skin: Negative for color change and rash. Neurological: Negative for dizziness, weakness and headaches. Hematological: Negative for adenopathy. Does not bruise/bleed easily. Psychiatric/Behavioral: Negative for dysphoric mood and sleep disturbance. The patient is not nervous/anxious. Objective:  Vitals:    09/19/23 1750 09/19/23 1822   BP: 142/80 134/74   BP Location: Left arm Left arm   Patient Position: Sitting Sitting   Cuff Size: Large Large   Pulse: 72    Resp: 18    Temp: 98 °F (36.7 °C)    TempSrc: Tympanic    SpO2: 97%    Weight: 128 kg (282 lb)    Height: 5' 8" (1.727 m)      Body mass index is 42.88 kg/m². Physical Exam  Vitals and nursing note reviewed. Constitutional:       Appearance: Normal appearance. He is well-developed. He is obese. HENT:      Head: Normocephalic and atraumatic. Right Ear: Tympanic membrane, ear canal and external ear normal.      Left Ear: Tympanic membrane, ear canal and external ear normal.      Nose: Nose normal.      Mouth/Throat:      Mouth: Mucous membranes are moist.      Pharynx: Oropharynx is clear. No posterior oropharyngeal erythema. Eyes:      Conjunctiva/sclera: Conjunctivae normal.      Pupils: Pupils are equal, round, and reactive to light. Neck:      Thyroid: No thyromegaly. Cardiovascular:      Rate and Rhythm: Normal rate and regular rhythm. Heart sounds: Normal heart sounds. No murmur heard. Pulmonary:      Effort: Pulmonary effort is normal.      Breath sounds: Normal breath sounds. No wheezing or rales.    Abdominal:      General: Bowel sounds are normal. There is no distension. Palpations: Abdomen is soft. There is no mass. Tenderness: There is no abdominal tenderness. Musculoskeletal:         General: No deformity. Normal range of motion. Cervical back: Normal range of motion and neck supple. Right lower leg: No edema. Left lower leg: No edema. Lymphadenopathy:      Cervical: No cervical adenopathy. Skin:     General: Skin is warm and dry. Capillary Refill: Capillary refill takes less than 2 seconds. Findings: No erythema or rash. Neurological:      General: No focal deficit present. Mental Status: He is alert and oriented to person, place, and time. Mental status is at baseline. Cranial Nerves: No cranial nerve deficit. Sensory: No sensory deficit. Motor: No abnormal muscle tone. Coordination: Coordination normal.   Psychiatric:         Mood and Affect: Mood normal.         Behavior: Behavior normal.         Thought Content:  Thought content normal.         Judgment: Judgment normal.

## 2023-09-19 NOTE — ASSESSMENT & PLAN NOTE
CPE done. Last Tdap was in 2019. Declined flu shot and COVID vaccines. Recommend Shingrix. Labs are up to date. Patient advised to schedule colonoscopy. Pt advised to follow a well balanced, heart healthy diet and to exercise on a regular basis. Not a smoker.

## 2023-10-12 ENCOUNTER — TELEPHONE (OUTPATIENT)
Age: 58
End: 2023-10-12

## 2023-10-12 ENCOUNTER — PREP FOR PROCEDURE (OUTPATIENT)
Age: 58
End: 2023-10-12

## 2023-10-12 DIAGNOSIS — Z86.010 HISTORY OF COLON POLYPS: Primary | ICD-10-CM

## 2023-10-12 NOTE — TELEPHONE ENCOUNTER
Rescheduled date of colonoscopy (as of today): 11/13/2023  Physician performing colonoscopy: DR Becky Habermann  Location of colonoscopy: [facility]  Bowel prep reviewed with patient: MIRALAX/DULCOLAX. Sent via email by Akash Peña Instructions reviewed with patient by: Akash Peña, as per previous documentation  Clearances: n/a      Patient rescheduled due to work schedule.

## 2023-10-12 NOTE — TELEPHONE ENCOUNTER
Scheduled date of colonoscopy (as of today): 10/23/23    Physician performing colonoscopy: Dr. Gill Current    Location of colonoscopy: [facility]    Bowel prep reviewed with patient: Miralax/Dulcolax    Instructions reviewed with patient by: Marcus Felty., sent via [facility] email, pt does not have access to his "[employer]hart    Clearances: N/A    PT PASSED OA

## 2023-10-29 ENCOUNTER — ANESTHESIA (OUTPATIENT)
Dept: ANESTHESIOLOGY | Facility: AMBULATORY SURGERY CENTER | Age: 58
End: 2023-10-29

## 2023-10-29 ENCOUNTER — ANESTHESIA EVENT (OUTPATIENT)
Dept: ANESTHESIOLOGY | Facility: AMBULATORY SURGERY CENTER | Age: 58
End: 2023-10-29

## 2023-11-06 ENCOUNTER — TELEPHONE (OUTPATIENT)
Dept: GASTROENTEROLOGY | Facility: CLINIC | Age: 58
End: 2023-11-06

## 2023-11-06 NOTE — TELEPHONE ENCOUNTER
Spoke to pt confirming pt's colonoscopy scheduled on 11/13/23 at [facility] with Dr Renu Cochran would be calling this 1-2 days prior with the arrival time. Pt has instructions and did not have any questions.

## 2023-11-12 RX ORDER — SODIUM CHLORIDE, SODIUM LACTATE, POTASSIUM CHLORIDE, CALCIUM CHLORIDE 600; 310; 30; 20 MG/100ML; MG/100ML; MG/100ML; MG/100ML
125 INJECTION, SOLUTION INTRAVENOUS CONTINUOUS
Status: CANCELLED | OUTPATIENT
Start: 2023-11-12

## 2023-11-13 ENCOUNTER — ANESTHESIA EVENT (OUTPATIENT)
Dept: GASTROENTEROLOGY | Facility: AMBULATORY SURGERY CENTER | Age: 58
End: 2023-11-13

## 2023-11-13 ENCOUNTER — HOSPITAL ENCOUNTER (OUTPATIENT)
Dept: GASTROENTEROLOGY | Facility: AMBULATORY SURGERY CENTER | Age: 58
Discharge: HOME | End: 2023-11-13
Payer: COMMERCIAL

## 2023-11-13 ENCOUNTER — ANESTHESIA (OUTPATIENT)
Dept: GASTROENTEROLOGY | Facility: AMBULATORY SURGERY CENTER | Age: 58
End: 2023-11-13

## 2023-11-13 VITALS
BODY MASS INDEX: 40.16 KG/M2 | TEMPERATURE: 97.4 F | HEIGHT: 68 IN | OXYGEN SATURATION: 98 % | SYSTOLIC BLOOD PRESSURE: 140 MMHG | WEIGHT: 265 LBS | DIASTOLIC BLOOD PRESSURE: 73 MMHG | RESPIRATION RATE: 18 BRPM | HEART RATE: 61 BPM

## 2023-11-13 DIAGNOSIS — Z86.010 HISTORY OF COLON POLYPS: ICD-10-CM

## 2023-11-13 PROCEDURE — 45385 COLONOSCOPY W/LESION REMOVAL: CPT | Performed by: INTERNAL MEDICINE

## 2023-11-13 PROCEDURE — 88305 TISSUE EXAM BY PATHOLOGIST: CPT | Performed by: PATHOLOGY

## 2023-11-13 RX ORDER — PROPOFOL 10 MG/ML
INJECTION, EMULSION INTRAVENOUS AS NEEDED
Status: DISCONTINUED | OUTPATIENT
Start: 2023-11-13 | End: 2023-11-13

## 2023-11-13 RX ORDER — LIDOCAINE HYDROCHLORIDE 20 MG/ML
INJECTION, SOLUTION EPIDURAL; INFILTRATION; INTRACAUDAL; PERINEURAL AS NEEDED
Status: DISCONTINUED | OUTPATIENT
Start: 2023-11-13 | End: 2023-11-13

## 2023-11-13 RX ORDER — SODIUM CHLORIDE, SODIUM LACTATE, POTASSIUM CHLORIDE, CALCIUM CHLORIDE 600; 310; 30; 20 MG/100ML; MG/100ML; MG/100ML; MG/100ML
125 INJECTION, SOLUTION INTRAVENOUS CONTINUOUS
Status: DISCONTINUED | OUTPATIENT
Start: 2023-11-13 | End: 2023-11-17 | Stop reason: HOSPADM

## 2023-11-13 RX ORDER — SODIUM CHLORIDE, SODIUM LACTATE, POTASSIUM CHLORIDE, CALCIUM CHLORIDE 600; 310; 30; 20 MG/100ML; MG/100ML; MG/100ML; MG/100ML
INJECTION, SOLUTION INTRAVENOUS CONTINUOUS PRN
Status: DISCONTINUED | OUTPATIENT
Start: 2023-11-13 | End: 2023-11-13

## 2023-11-13 RX ADMIN — PROPOFOL 50 MG: 10 INJECTION, EMULSION INTRAVENOUS at 14:37

## 2023-11-13 RX ADMIN — PROPOFOL 50 MG: 10 INJECTION, EMULSION INTRAVENOUS at 14:28

## 2023-11-13 RX ADMIN — PROPOFOL 150 MG: 10 INJECTION, EMULSION INTRAVENOUS at 14:27

## 2023-11-13 RX ADMIN — LIDOCAINE HYDROCHLORIDE 100 MG: 20 INJECTION, SOLUTION EPIDURAL; INFILTRATION; INTRACAUDAL; PERINEURAL at 14:26

## 2023-11-13 RX ADMIN — PROPOFOL 50 MG: 10 INJECTION, EMULSION INTRAVENOUS at 14:31

## 2023-11-13 RX ADMIN — SODIUM CHLORIDE, SODIUM LACTATE, POTASSIUM CHLORIDE, CALCIUM CHLORIDE: 600; 310; 30; 20 INJECTION, SOLUTION INTRAVENOUS at 14:19

## 2023-11-13 RX ADMIN — PROPOFOL 50 MG: 10 INJECTION, EMULSION INTRAVENOUS at 14:34

## 2023-11-13 RX ADMIN — PROPOFOL 50 MG: 10 INJECTION, EMULSION INTRAVENOUS at 14:41

## 2023-11-13 RX ADMIN — PROPOFOL 50 MG: 10 INJECTION, EMULSION INTRAVENOUS at 14:29

## 2023-11-13 NOTE — ANESTHESIA POSTPROCEDURE EVALUATION
Post-Op Assessment Note    CV Status:  Stable    Pain management: adequate     Mental Status:  Awake and arousable   Hydration Status:  Euvolemic   PONV Controlled:  Controlled   Airway Patency:  Patent      Post Op Vitals Reviewed: Yes      Staff: CRNA         No notable events documented.     BP  130/58   Temp     Pulse  78   Resp   16   SpO2   97

## 2023-11-13 NOTE — H&P
History and Physical - [facility]  Kwabenadeepalifelisha Srinivasan Jack 60 y.o. male MRN: [id number]                  HPI: Genaro Monreal is a 60y.o. year old male who presents for history of polyp      REVIEW OF SYSTEMS: Per the HPI, and otherwise unremarkable.     Historical Information   Past Medical History:   Diagnosis Date    Colon polyp     Hyperlipidemia     Hypertension      Past Surgical History:   Procedure Laterality Date    APPENDECTOMY      ARTHROPLASTY TOE Right 07/05/2016    Procedure: GREAT TOE TOTAL JOINT REPLACEMENT ;  Surgeon: Jerrell Orr DPM;  Location: [facility];  Service:     COLONOSCOPY      SHOULDER ARTHROSCOPY Right      Social History   Social History     Substance and Sexual Activity   Alcohol Use No     Social History     Substance and Sexual Activity   Drug Use No     Social History     Tobacco Use   Smoking Status Never   Smokeless Tobacco Never     Family History   Problem Relation Age of Onset    No Known Problems Mother     Heart attack Father        Meds/Allergies       Current Outpatient Medications:     ASPIRIN 81 PO    atorvastatin (LIPITOR) 80 mg tablet    valsartan-hydrochlorothiazide (DIOVAN-HCT) 80-12.5 MG per tablet    Current Facility-Administered Medications:     lactated ringers infusion, 125 mL/hr, Intravenous, Continuous    Facility-Administered Medications Ordered in Other Encounters:     lactated ringers infusion, , Intravenous, Continuous PRN, New Bag at 11/13/23 1419    lidocaine (PF) (XYLOCAINE-MPF) 2 % injection, , Intravenous, PRN, 100 mg at 11/13/23 1426    No Known Allergies    Objective     BP (!) 178/83   Pulse 61   Temp (!) 97.4 °F (36.3 °C) (Oral)   Resp 18   Ht 5' 8" (1.727 m)   Wt 120 kg (265 lb)   SpO2 96%   BMI 40.29 kg/m²       PHYSICAL EXAM    Gen: NAD  Head: NCAT  CV: RRR  CHEST: Clear  ABD: soft, NT/ND  EXT: no edema      ASSESSMENT/PLAN:  This is a 60y.o. year old male here for colonoscopy, and he is stable and optimized for his procedure.

## 2023-11-13 NOTE — ANESTHESIA PREPROCEDURE EVALUATION
Procedure:  COLONOSCOPY    Relevant Problems   ANESTHESIA (within normal limits)      CARDIO   (+) Essential hypertension   (+) Hypercholesterolemia      MUSCULOSKELETAL   (+) Arthritis of big toe      Other   (+) Morbid obesity due to excess calories [facility])        Physical Exam    Airway    Mallampati score: II  TM Distance: >3 FB  Neck ROM: full     Dental        Cardiovascular      Pulmonary      Other Findings      Anesthesia Plan  ASA Score- 3     Anesthesia Type- IV sedation with anesthesia with ASA Monitors. Additional Monitors:     Airway Plan:            Plan Factors-Exercise tolerance (METS): >4 METS. Chart reviewed. Existing labs reviewed. Patient summary reviewed. Patient is not a current smoker. Induction- intravenous. Postoperative Plan-     Informed Consent- Anesthetic plan and risks discussed with patient. I personally reviewed this patient with the CRNA. Discussed and agreed on the Anesthesia Plan with the CRNA. Sunita Nieves

## 2023-11-20 PROCEDURE — 88305 TISSUE EXAM BY PATHOLOGIST: CPT | Performed by: PATHOLOGY

## 2023-11-21 NOTE — RESULT ENCOUNTER NOTE
Please call the patient regarding his abnormal result.  Has polyp, Follow up colonscopy in 7 years  Follow up in my office as needed

## 2024-02-28 ENCOUNTER — TELEPHONE (OUTPATIENT)
Age: 59
End: 2024-02-28

## 2024-02-28 DIAGNOSIS — R73.03 PREDIABETES: Primary | ICD-10-CM

## 2024-02-28 DIAGNOSIS — E78.00 HYPERCHOLESTEROLEMIA: ICD-10-CM

## 2024-02-28 NOTE — TELEPHONE ENCOUNTER
Lakshmi called, he's requesting that lab work be put in for him to do before his appt with Dr. Ortiz next month. He would like it faxed over to [facility].  Please advise

## 2024-02-29 PROBLEM — R73.01 IMPAIRED FASTING GLUCOSE: Status: ACTIVE | Noted: 2024-02-29

## 2024-06-09 DIAGNOSIS — I10 ESSENTIAL HYPERTENSION: ICD-10-CM

## 2024-06-09 DIAGNOSIS — E78.00 HYPERCHOLESTEROLEMIA: ICD-10-CM

## 2024-06-09 RX ORDER — ATORVASTATIN CALCIUM 80 MG/1
80 TABLET, FILM COATED ORAL DAILY
Qty: 30 TABLET | Refills: 5 | Status: SHIPPED | OUTPATIENT
Start: 2024-06-09

## 2024-06-09 RX ORDER — VALSARTAN AND HYDROCHLOROTHIAZIDE 80; 12.5 MG/1; MG/1
1 TABLET, FILM COATED ORAL DAILY
Qty: 30 TABLET | Refills: 5 | Status: SHIPPED | OUTPATIENT
Start: 2024-06-09

## 2024-09-10 ENCOUNTER — APPOINTMENT (OUTPATIENT)
Dept: URGENT CARE | Age: 59
End: 2024-09-10
Payer: OTHER MISCELLANEOUS

## 2024-09-10 PROCEDURE — 99283 EMERGENCY DEPT VISIT LOW MDM: CPT | Performed by: STUDENT IN AN ORGANIZED HEALTH CARE EDUCATION/TRAINING PROGRAM

## 2024-09-10 PROCEDURE — G0382 LEV 3 HOSP TYPE B ED VISIT: HCPCS | Performed by: STUDENT IN AN ORGANIZED HEALTH CARE EDUCATION/TRAINING PROGRAM

## 2024-09-17 ENCOUNTER — APPOINTMENT (OUTPATIENT)
Age: 59
End: 2024-09-17
Payer: OTHER MISCELLANEOUS

## 2024-09-17 PROCEDURE — 99214 OFFICE O/P EST MOD 30 MIN: CPT | Performed by: EMERGENCY MEDICINE

## 2024-09-24 ENCOUNTER — APPOINTMENT (OUTPATIENT)
Age: 59
End: 2024-09-24
Payer: OTHER MISCELLANEOUS

## 2024-09-24 ENCOUNTER — TELEPHONE (OUTPATIENT)
Age: 59
End: 2024-09-24

## 2024-09-24 DIAGNOSIS — I10 ESSENTIAL HYPERTENSION: ICD-10-CM

## 2024-09-24 DIAGNOSIS — E78.00 HYPERCHOLESTEROLEMIA: ICD-10-CM

## 2024-09-24 PROCEDURE — 99213 OFFICE O/P EST LOW 20 MIN: CPT | Performed by: EMERGENCY MEDICINE

## 2024-09-24 NOTE — TELEPHONE ENCOUNTER
Pt  requested a refill for the atorvastatin (LIPITOR) 80 mg tablet and     valsartan-hydrochlorothiazide (DIOVAN-HCT) 80-12.5 MG per tablet   ( 2 month supply). Please send to:       [id number] - [place] - [id number]       Thank you for your help.

## 2024-09-25 RX ORDER — VALSARTAN AND HYDROCHLOROTHIAZIDE 80; 12.5 MG/1; MG/1
1 TABLET, FILM COATED ORAL DAILY
Qty: 30 TABLET | Refills: 5 | Status: SHIPPED | OUTPATIENT
Start: 2024-09-25

## 2024-09-25 RX ORDER — ATORVASTATIN CALCIUM 80 MG/1
80 TABLET, FILM COATED ORAL DAILY
Qty: 30 TABLET | Refills: 5 | Status: SHIPPED | OUTPATIENT
Start: 2024-09-25

## 2024-10-01 ENCOUNTER — APPOINTMENT (OUTPATIENT)
Age: 59
End: 2024-10-01
Payer: OTHER MISCELLANEOUS

## 2024-10-01 PROCEDURE — 99214 OFFICE O/P EST MOD 30 MIN: CPT | Performed by: EMERGENCY MEDICINE

## 2024-10-09 ENCOUNTER — APPOINTMENT (OUTPATIENT)
Age: 59
End: 2024-10-09
Payer: OTHER MISCELLANEOUS

## 2024-10-09 PROCEDURE — 99214 OFFICE O/P EST MOD 30 MIN: CPT

## 2024-10-10 ENCOUNTER — TELEPHONE (OUTPATIENT)
Dept: FAMILY MEDICINE CLINIC | Facility: CLINIC | Age: 59
End: 2024-10-10

## 2024-10-10 DIAGNOSIS — E78.00 HYPERCHOLESTEROLEMIA: Primary | ICD-10-CM

## 2024-10-10 LAB
ALBUMIN SERPL-MCNC: 4.9 G/DL (ref 3.6–5.1)
ALBUMIN/GLOB SERPL: 1.9 (CALC) (ref 1–2.5)
ALP SERPL-CCNC: 68 U/L (ref 35–144)
ALT SERPL-CCNC: 33 U/L (ref 9–46)
AST SERPL-CCNC: 26 U/L (ref 10–35)
BILIRUB SERPL-MCNC: 0.8 MG/DL (ref 0.2–1.2)
BUN SERPL-MCNC: 26 MG/DL (ref 7–25)
BUN/CREAT SERPL: 30 (CALC) (ref 6–22)
CALCIUM SERPL-MCNC: 9.7 MG/DL (ref 8.6–10.3)
CHLORIDE SERPL-SCNC: 103 MMOL/L (ref 98–110)
CHOLEST SERPL-MCNC: 208 MG/DL
CHOLEST/HDLC SERPL: 3.1 (CALC)
CO2 SERPL-SCNC: 28 MMOL/L (ref 20–32)
CREAT SERPL-MCNC: 0.86 MG/DL (ref 0.7–1.35)
GFR/BSA.PRED SERPLBLD CYS-BASED-ARV: 99 ML/MIN/1.73M2
GLOBULIN SER CALC-MCNC: 2.6 G/DL (CALC) (ref 1.9–3.7)
GLUCOSE SERPL-MCNC: 95 MG/DL (ref 65–99)
HBA1C MFR BLD: 5.9 % OF TOTAL HGB
HDLC SERPL-MCNC: 68 MG/DL
LDLC SERPL CALC-MCNC: 92 MG/DL (CALC)
NONHDLC SERPL-MCNC: 140 MG/DL (CALC)
POTASSIUM SERPL-SCNC: 4.3 MMOL/L (ref 3.5–5.3)
PROT SERPL-MCNC: 7.5 G/DL (ref 6.1–8.1)
SODIUM SERPL-SCNC: 141 MMOL/L (ref 135–146)
TRIGL SERPL-MCNC: 360 MG/DL

## 2024-10-10 NOTE — TELEPHONE ENCOUNTER
----- Message from Mathew Ortiz MD sent at 10/10/2024  5:55 AM EDT -----  Call pt. Chol higher. Inc exercise and follow a low chol diet. Recheck in 4 mths.

## 2024-10-22 ENCOUNTER — APPOINTMENT (OUTPATIENT)
Age: 59
End: 2024-10-22
Payer: OTHER MISCELLANEOUS

## 2024-10-22 PROCEDURE — 99213 OFFICE O/P EST LOW 20 MIN: CPT | Performed by: EMERGENCY MEDICINE

## 2024-11-12 PROBLEM — R73.01 IMPAIRED FASTING GLUCOSE: Status: RESOLVED | Noted: 2024-02-29 | Resolved: 2024-11-12

## 2025-01-14 ENCOUNTER — OFFICE VISIT (OUTPATIENT)
Dept: FAMILY MEDICINE CLINIC | Facility: CLINIC | Age: 60
End: 2025-01-14
Payer: COMMERCIAL

## 2025-01-14 VITALS
HEART RATE: 76 BPM | BODY MASS INDEX: 41.52 KG/M2 | RESPIRATION RATE: 16 BRPM | HEIGHT: 68 IN | SYSTOLIC BLOOD PRESSURE: 132 MMHG | DIASTOLIC BLOOD PRESSURE: 70 MMHG | OXYGEN SATURATION: 98 % | WEIGHT: 274 LBS

## 2025-01-14 DIAGNOSIS — R73.03 PREDIABETES: ICD-10-CM

## 2025-01-14 DIAGNOSIS — I10 ESSENTIAL HYPERTENSION: Primary | ICD-10-CM

## 2025-01-14 DIAGNOSIS — Z00.00 ENCOUNTER FOR ANNUAL PHYSICAL EXAM: ICD-10-CM

## 2025-01-14 DIAGNOSIS — E66.01 MORBID OBESITY DUE TO EXCESS CALORIES (HCC): ICD-10-CM

## 2025-01-14 DIAGNOSIS — Z12.5 PROSTATE CANCER SCREENING: ICD-10-CM

## 2025-01-14 DIAGNOSIS — E78.00 HYPERCHOLESTEROLEMIA: ICD-10-CM

## 2025-01-14 PROCEDURE — 99214 OFFICE O/P EST MOD 30 MIN: CPT | Performed by: FAMILY MEDICINE

## 2025-01-14 PROCEDURE — 99396 PREV VISIT EST AGE 40-64: CPT | Performed by: FAMILY MEDICINE

## 2025-01-14 RX ORDER — VALSARTAN AND HYDROCHLOROTHIAZIDE 80; 12.5 MG/1; MG/1
1 TABLET, FILM COATED ORAL DAILY
Qty: 90 TABLET | Refills: 2 | Status: SHIPPED | OUTPATIENT
Start: 2025-01-14

## 2025-01-14 RX ORDER — ATORVASTATIN CALCIUM 80 MG/1
80 TABLET, FILM COATED ORAL DAILY
Qty: 90 TABLET | Refills: 2 | Status: SHIPPED | OUTPATIENT
Start: 2025-01-14

## 2025-01-14 NOTE — ASSESSMENT & PLAN NOTE
CPE done. Last Tdap was in 2019. Declined flu shot and COVID vaccines. Recommend Shingrix series. Will check labs. Had colonoscopy in November 2023 by Dr Bernstein and patient had 1 polyp. Next one is Due in November 2030. Pt advised to follow a well balanced, heart healthy diet and to exercise on a regular basis. Not a smoker.

## 2025-01-14 NOTE — ASSESSMENT & PLAN NOTE
Blood pressure ok. Continue valsartan HCT 80-12.5 qd. Pt advised to continue low Na diet and to exercise on a regular basis.   Orders:  •  Lipid panel; Future  •  Comprehensive metabolic panel; Future  •  CBC and differential; Future  •  valsartan-hydrochlorothiazide (DIOVAN-HCT) 80-12.5 MG per tablet; Take 1 tablet by mouth daily

## 2025-01-14 NOTE — ASSESSMENT & PLAN NOTE
Recheck lipids and LFTs. Continue atorvastatin 80 mg qd. Advised pt to follow a low cholesterol diet and to exercise on a regular basis.   Orders:  •  Lipid panel; Future  •  Comprehensive metabolic panel; Future  •  atorvastatin (LIPITOR) 80 mg tablet; Take 1 tablet (80 mg total) by mouth daily

## 2025-01-14 NOTE — ASSESSMENT & PLAN NOTE
Recheck A1C. Advised pt to follow a low carb diet and to exercise on a regular basis.  Orders:  •  Comprehensive metabolic panel; Future  •  Hemoglobin A1C With EAG; Future

## 2025-01-14 NOTE — PROGRESS NOTES
Name: Lakshmi Meredith      : 1965      MRN: [id number]  Encounter Provider: Mathew Ortiz MD  Encounter Date: 2025   Encounter department: [facility] MEDICINE  :  Assessment & Plan  Encounter for annual physical exam  CPE done. Last Tdap was in . Declined flu shot and COVID vaccines. Recommend Shingrix series. Will check labs. Had colonoscopy in 2023 by Dr Bernstein and patient had 1 polyp. Next one is Due in 2030. Pt advised to follow a well balanced, heart healthy diet and to exercise on a regular basis. Not a smoker.        Prostate cancer screening    Orders:  •  PSA, Total Screen; Future    Essential hypertension  Blood pressure ok. Continue valsartan HCT 80-12.5 qd. Pt advised to continue low Na diet and to exercise on a regular basis.   Orders:  •  Lipid panel; Future  •  Comprehensive metabolic panel; Future  •  CBC and differential; Future  •  valsartan-hydrochlorothiazide (DIOVAN-HCT) 80-12.5 MG per tablet; Take 1 tablet by mouth daily    Hypercholesterolemia  Recheck lipids and LFTs. Continue atorvastatin 80 mg qd. Advised pt to follow a low cholesterol diet and to exercise on a regular basis.   Orders:  •  Lipid panel; Future  •  Comprehensive metabolic panel; Future  •  atorvastatin (LIPITOR) 80 mg tablet; Take 1 tablet (80 mg total) by mouth daily    Prediabetes  Recheck A1C. Advised pt to follow a low carb diet and to exercise on a regular basis.  Orders:  •  Comprehensive metabolic panel; Future  •  Hemoglobin A1C With EAG; Future    Morbid obesity due to excess calories (HCC)  Discussed smaller portions, healthy snacks, and regular exercise.              Depression Screening and Follow-up Plan: Patient was screened for depression during today's encounter. They screened negative with a PHQ-2 score of 0.      History of Present Illness     Patient here for physical and follow-up Hypertension, Hyperlipidemia, Prediabetes. Patient doing ok. No chest pain  "or shortness of breath. No headaches. No abdominal pain. Last Tdap was in 2019 and patient doesn't want other shots. Had colonoscopy in November 2023 and had 1 polyp. No regular exercise. Not a smoker. Occasional ETOH. Blood pressure usually ok at home. No pain or new complaints.       Review of Systems   Constitutional:  Negative for activity change, appetite change, fatigue and unexpected weight change.   HENT:  Negative for congestion, ear pain, rhinorrhea, sore throat and trouble swallowing.    Eyes:  Negative for pain, discharge and visual disturbance.   Respiratory:  Negative for cough, shortness of breath and wheezing.    Cardiovascular:  Negative for chest pain, palpitations and leg swelling.   Gastrointestinal:  Negative for abdominal pain, constipation, diarrhea, nausea and vomiting.   Endocrine: Negative for polydipsia, polyphagia and polyuria.   Genitourinary:  Negative for difficulty urinating and hematuria.   Musculoskeletal:  Negative for arthralgias, back pain, gait problem, myalgias and neck pain.   Skin:  Negative for color change and rash.   Neurological:  Negative for dizziness, weakness and headaches.   Hematological:  Negative for adenopathy. Does not bruise/bleed easily.   Psychiatric/Behavioral:  Negative for dysphoric mood and sleep disturbance. The patient is not nervous/anxious.        Objective   /70 (BP Location: Left arm, Patient Position: Sitting, Cuff Size: Large)   Pulse 76   Resp 16   Ht 5' 8\" (1.727 m)   Wt 124 kg (274 lb)   SpO2 98%   BMI 41.66 kg/m²      Physical Exam  Vitals and nursing note reviewed.   Constitutional:       General: He is not in acute distress.     Appearance: Normal appearance. He is well-developed. He is obese. He is not ill-appearing.   HENT:      Head: Normocephalic and atraumatic.      Right Ear: Tympanic membrane, ear canal and external ear normal.      Left Ear: Tympanic membrane, ear canal and external ear normal.      Nose: Nose normal. No " congestion or rhinorrhea.      Mouth/Throat:      Mouth: Mucous membranes are moist.      Pharynx: Oropharynx is clear. No posterior oropharyngeal erythema.   Eyes:      General:         Right eye: No discharge.      Conjunctiva/sclera: Conjunctivae normal.      Pupils: Pupils are equal, round, and reactive to light.   Neck:      Thyroid: No thyromegaly.   Cardiovascular:      Rate and Rhythm: Normal rate and regular rhythm.      Heart sounds: No murmur heard.  Pulmonary:      Effort: Pulmonary effort is normal. No respiratory distress.      Breath sounds: Normal breath sounds. No wheezing.   Abdominal:      General: Bowel sounds are normal.      Palpations: Abdomen is soft. There is no mass.      Tenderness: There is no abdominal tenderness.   Musculoskeletal:         General: No swelling or deformity. Normal range of motion.      Cervical back: Normal range of motion and neck supple. No tenderness.      Right lower leg: No edema.      Left lower leg: No edema.   Lymphadenopathy:      Cervical: No cervical adenopathy.   Skin:     General: Skin is warm and dry.      Capillary Refill: Capillary refill takes less than 2 seconds.      Findings: No erythema or rash.   Neurological:      General: No focal deficit present.      Mental Status: He is alert and oriented to person, place, and time.      Sensory: No sensory deficit.   Psychiatric:         Mood and Affect: Mood normal.         Behavior: Behavior normal.         Thought Content: Thought content normal.         Judgment: Judgment normal.

## 2025-02-05 LAB
ALBUMIN SERPL-MCNC: 4.5 G/DL (ref 3.6–5.1)
ALBUMIN/GLOB SERPL: 1.7 (CALC) (ref 1–2.5)
ALP SERPL-CCNC: 61 U/L (ref 35–144)
ALT SERPL-CCNC: 41 U/L (ref 9–46)
AST SERPL-CCNC: 29 U/L (ref 10–35)
BASOPHILS # BLD AUTO: 51 CELLS/UL (ref 0–200)
BASOPHILS NFR BLD AUTO: 0.5 %
BILIRUB SERPL-MCNC: 1 MG/DL (ref 0.2–1.2)
BUN SERPL-MCNC: 17 MG/DL (ref 7–25)
BUN/CREAT SERPL: NORMAL (CALC) (ref 6–22)
CALCIUM SERPL-MCNC: 9.5 MG/DL (ref 8.6–10.3)
CHLORIDE SERPL-SCNC: 101 MMOL/L (ref 98–110)
CHOLEST SERPL-MCNC: 190 MG/DL
CHOLEST/HDLC SERPL: 3 (CALC)
CO2 SERPL-SCNC: 29 MMOL/L (ref 20–32)
CREAT SERPL-MCNC: 0.75 MG/DL (ref 0.7–1.35)
EOSINOPHIL # BLD AUTO: 224 CELLS/UL (ref 15–500)
EOSINOPHIL NFR BLD AUTO: 2.2 %
ERYTHROCYTE [DISTWIDTH] IN BLOOD BY AUTOMATED COUNT: 11.7 % (ref 11–15)
EST. AVERAGE GLUCOSE BLD GHB EST-MCNC: 126 MG/DL
EST. AVERAGE GLUCOSE BLD GHB EST-SCNC: 7 MMOL/L
GFR/BSA.PRED SERPLBLD CYS-BASED-ARV: 103 ML/MIN/1.73M2
GLOBULIN SER CALC-MCNC: 2.6 G/DL (CALC) (ref 1.9–3.7)
GLUCOSE SERPL-MCNC: 98 MG/DL (ref 65–99)
HBA1C MFR BLD: 6 % OF TOTAL HGB
HCT VFR BLD AUTO: 42.7 % (ref 38.5–50)
HDLC SERPL-MCNC: 63 MG/DL
HGB BLD-MCNC: 14.9 G/DL (ref 13.2–17.1)
LDLC SERPL CALC-MCNC: 85 MG/DL (CALC)
LYMPHOCYTES # BLD AUTO: 2989 CELLS/UL (ref 850–3900)
LYMPHOCYTES NFR BLD AUTO: 29.3 %
MCH RBC QN AUTO: 33.8 PG (ref 27–33)
MCHC RBC AUTO-ENTMCNC: 34.9 G/DL (ref 32–36)
MCV RBC AUTO: 96.8 FL (ref 80–100)
MONOCYTES # BLD AUTO: 847 CELLS/UL (ref 200–950)
MONOCYTES NFR BLD AUTO: 8.3 %
NEUTROPHILS # BLD AUTO: 6089 CELLS/UL (ref 1500–7800)
NEUTROPHILS NFR BLD AUTO: 59.7 %
NONHDLC SERPL-MCNC: 127 MG/DL (CALC)
PLATELET # BLD AUTO: 243 THOUSAND/UL (ref 140–400)
PMV BLD REES-ECKER: 9.5 FL (ref 7.5–12.5)
POTASSIUM SERPL-SCNC: 4.2 MMOL/L (ref 3.5–5.3)
PROT SERPL-MCNC: 7.1 G/DL (ref 6.1–8.1)
PSA SERPL-MCNC: 0.52 NG/ML
RBC # BLD AUTO: 4.41 MILLION/UL (ref 4.2–5.8)
SODIUM SERPL-SCNC: 139 MMOL/L (ref 135–146)
TRIGL SERPL-MCNC: 349 MG/DL
WBC # BLD AUTO: 10.2 THOUSAND/UL (ref 3.8–10.8)

## 2025-03-31 ENCOUNTER — TELEPHONE (OUTPATIENT)
Age: 60
End: 2025-03-31

## 2025-03-31 NOTE — TELEPHONE ENCOUNTER
Pt is having a hard time getting into his Mychart to view his lab results. He is requesting we print out his most recent lab results for him to    Requesting a call back when ready for

## 2025-04-07 NOTE — TELEPHONE ENCOUNTER
Patient calling to request his lab results be faxed over with his name and  on it to [facility] at [phone] , please review